# Patient Record
Sex: MALE
[De-identification: names, ages, dates, MRNs, and addresses within clinical notes are randomized per-mention and may not be internally consistent; named-entity substitution may affect disease eponyms.]

---

## 2019-06-25 PROBLEM — Z00.00 ENCOUNTER FOR PREVENTIVE HEALTH EXAMINATION: Status: ACTIVE | Noted: 2019-06-25

## 2019-07-25 ENCOUNTER — APPOINTMENT (OUTPATIENT)
Dept: CARDIOLOGY | Facility: CLINIC | Age: 69
End: 2019-07-25
Payer: MEDICARE

## 2019-07-25 ENCOUNTER — NON-APPOINTMENT (OUTPATIENT)
Age: 69
End: 2019-07-25

## 2019-07-25 VITALS
HEIGHT: 70 IN | HEART RATE: 85 BPM | RESPIRATION RATE: 16 BRPM | OXYGEN SATURATION: 95 % | SYSTOLIC BLOOD PRESSURE: 152 MMHG | DIASTOLIC BLOOD PRESSURE: 103 MMHG | BODY MASS INDEX: 36.65 KG/M2 | WEIGHT: 256 LBS

## 2019-07-25 DIAGNOSIS — N40.0 BENIGN PROSTATIC HYPERPLASIA WITHOUT LOWER URINARY TRACT SYMPMS: ICD-10-CM

## 2019-07-25 DIAGNOSIS — Z86.39 PERSONAL HISTORY OF OTHER ENDOCRINE, NUTRITIONAL AND METABOLIC DISEASE: ICD-10-CM

## 2019-07-25 PROCEDURE — 93000 ELECTROCARDIOGRAM COMPLETE: CPT

## 2019-07-25 PROCEDURE — 99204 OFFICE O/P NEW MOD 45 MIN: CPT

## 2019-07-25 RX ORDER — KRILL/OM-3/DHA/EPA/PHOSPHO/AST 350MG-90MG
CAPSULE ORAL
Refills: 0 | Status: ACTIVE | COMMUNITY

## 2019-07-25 RX ORDER — TAMSULOSIN HYDROCHLORIDE 0.4 MG/1
0.4 CAPSULE ORAL
Refills: 0 | Status: ACTIVE | COMMUNITY

## 2019-07-25 NOTE — HISTORY OF PRESENT ILLNESS
[FreeTextEntry1] : Patient is a 68yo M with HLD,  HTN here for cardiac evaluation. Retired , lives with his wife. HAs 2 sons and 3 grandkids, not in touch with all of them however. Not very active, stopped treadmill when right knee bothered him. Stopped 6 months ago. Feels room spinning recently if gets up quickly or turns head certain positions. Feels winded up stairs. Patient denies PND/orthopnea/edema/palpitations/syncope/claudication. HAd tried 2 statins in past but didn’t tolerate it due to muscle pains/cramps. \par \par ROS: GI negative, all others negative

## 2019-07-25 NOTE — DISCUSSION/SUMMARY
[FreeTextEntry1] : Patient is a 68yo M with obesity, HTN, HLD, BPH here for cardiac evaluation. Gets some mild ALFORD and ECG with ? inferoposterior infarct although could be lead placement. Needs BP treated and HLD, ASCVD is 20%. Will start with BP med and then statin once tolerating well. Has not tolerated statin in past. Vertigo likely BPPV, will evaluate carotids as well. Regarding upcoming prostate biopsy he is CV stable to have procedure done.\par \par 1. Echo and nuclear stress (unable to go on treadmill due to knee pain, will be pharmacologic) test to evaluate ECG and ALFORD\par 2. Carotid to evaluate vertigo\par 3. Valsartan 80mg daily, check BMP/Mg 2 weeks\par 4. Recheck lipids, will start statin at follow up\par 5. CV stable for prostate biopsy\par 6. Follow up after testing in coming weeks

## 2019-07-25 NOTE — PHYSICAL EXAM
[General Appearance - Well Developed] : well developed [General Appearance - Well Nourished] : well nourished [Normal Conjunctiva] : the conjunctiva exhibited no abnormalities [Normal Oropharynx] : normal oropharynx [Normal Jugular Venous V Waves Present] : normal jugular venous V waves present [Heart Rate And Rhythm] : heart rate and rhythm were normal [Heart Sounds] : normal S1 and S2 [] : no respiratory distress [Respiration, Rhythm And Depth] : normal respiratory rhythm and effort [Auscultation Breath Sounds / Voice Sounds] : lungs were clear to auscultation bilaterally [Bowel Sounds] : normal bowel sounds [Abdomen Soft] : soft [Abdomen Tenderness] : non-tender [Abnormal Walk] : normal gait [Nail Clubbing] : no clubbing of the fingernails [Cyanosis, Localized] : no localized cyanosis [Skin Color & Pigmentation] : normal skin color and pigmentation [Skin Turgor] : normal skin turgor [Oriented To Time, Place, And Person] : oriented to person, place, and time [Affect] : the affect was normal [FreeTextEntry1] : no edema

## 2019-08-16 ENCOUNTER — APPOINTMENT (OUTPATIENT)
Dept: CARDIOLOGY | Facility: CLINIC | Age: 69
End: 2019-08-16
Payer: MEDICARE

## 2019-08-16 PROCEDURE — 93015 CV STRESS TEST SUPVJ I&R: CPT

## 2019-08-16 PROCEDURE — 78452 HT MUSCLE IMAGE SPECT MULT: CPT

## 2019-08-16 PROCEDURE — A9500: CPT

## 2019-08-16 RX ORDER — REGADENOSON 0.08 MG/ML
0.4 INJECTION, SOLUTION INTRAVENOUS
Qty: 4 | Refills: 0 | Status: COMPLETED | OUTPATIENT
Start: 2019-08-16

## 2019-08-16 RX ADMIN — REGADENOSON 4 MG/5ML: 0.08 INJECTION, SOLUTION INTRAVENOUS at 00:00

## 2019-08-19 ENCOUNTER — APPOINTMENT (OUTPATIENT)
Dept: CARDIOLOGY | Facility: CLINIC | Age: 69
End: 2019-08-19
Payer: MEDICARE

## 2019-08-19 PROCEDURE — 93306 TTE W/DOPPLER COMPLETE: CPT

## 2019-08-19 PROCEDURE — ZZZZZ: CPT

## 2019-08-19 RX ORDER — PERFLUTREN 6.52 MG/ML
6.52 INJECTION, SUSPENSION INTRAVENOUS
Qty: 2 | Refills: 0 | Status: COMPLETED | OUTPATIENT
Start: 2019-08-19

## 2019-08-19 RX ADMIN — PERFLUTREN MG/ML: 6.52 INJECTION, SUSPENSION INTRAVENOUS at 00:00

## 2019-08-28 ENCOUNTER — APPOINTMENT (OUTPATIENT)
Dept: CARDIOLOGY | Facility: CLINIC | Age: 69
End: 2019-08-28
Payer: MEDICARE

## 2019-08-28 PROCEDURE — 93880 EXTRACRANIAL BILAT STUDY: CPT

## 2019-09-03 ENCOUNTER — NON-APPOINTMENT (OUTPATIENT)
Age: 69
End: 2019-09-03

## 2019-09-03 ENCOUNTER — APPOINTMENT (OUTPATIENT)
Dept: CARDIOLOGY | Facility: CLINIC | Age: 69
End: 2019-09-03
Payer: MEDICARE

## 2019-09-03 VITALS
HEIGHT: 70 IN | HEART RATE: 99 BPM | RESPIRATION RATE: 16 BRPM | DIASTOLIC BLOOD PRESSURE: 80 MMHG | SYSTOLIC BLOOD PRESSURE: 123 MMHG | WEIGHT: 257 LBS | BODY MASS INDEX: 36.79 KG/M2

## 2019-09-03 DIAGNOSIS — R42 DIZZINESS AND GIDDINESS: ICD-10-CM

## 2019-09-03 PROCEDURE — 93000 ELECTROCARDIOGRAM COMPLETE: CPT

## 2019-09-03 PROCEDURE — 99214 OFFICE O/P EST MOD 30 MIN: CPT

## 2019-09-03 NOTE — ASSESSMENT
[FreeTextEntry1] : ECG: SR, consider prior inferoposterior MI \par \par ECHO 8/2019:\par 1. Mildly dilated LV with preserved systolic function and no RWAM, EF 60-65%\par 2. Normal RV/LA/RA\par 3. No clinically significant valvular abnormalities\par 4. TDS/TLS due to body habitus\par \par CAROTID 8/2019:\par 1. No evidence atherosclerosis\par 2. Tortuous vessels bilaterally \par \par  HDL 68   (1/2019)

## 2019-09-03 NOTE — DISCUSSION/SUMMARY
[FreeTextEntry1] : Patient is a 70yo M with obesity, HTN, HLD, BPH here for cardiac follow up. Echo shows normal biventricular function and no clinically significant valvular abnormalities, carotid unremarkable as well. ASCVD is 20%.  Has not tolerated statin in past, ? which agent. Vertigo likely BPPV. Reviewed nuclear stress test images and defects all appear artifactual from diaphragmatic attenuation, bowel tracer uptake and body habitus, regional wall motion is normal. BP better. NO RWMA on echo either to suggest infarct. \par \par 1. ASA 81mg daily and start pravastatin 20mg qhs (with 100mg CoQ 10 30 minutes prior to prevent side effects) given increased CV risk (ASCVD) \par 2. Recommend aggressive diet and lifestyle modifications, counselled on weight loss\par 3. Recommend 30 minutes moderate intensity aerobic activity 5 days per week \par 4. Acceptable low CV risk for surgery for prostate \par 5. REgular PMD follow up \par 6. Check BMP/MG  now on ARB\par 7. Repeat lipids in 6 months

## 2019-09-03 NOTE — PHYSICAL EXAM
[General Appearance - Well Developed] : well developed [General Appearance - Well Nourished] : well nourished [Normal Conjunctiva] : the conjunctiva exhibited no abnormalities [Normal Jugular Venous V Waves Present] : normal jugular venous V waves present [Normal Oropharynx] : normal oropharynx [] : no respiratory distress [Respiration, Rhythm And Depth] : normal respiratory rhythm and effort [Auscultation Breath Sounds / Voice Sounds] : lungs were clear to auscultation bilaterally [Heart Rate And Rhythm] : heart rate and rhythm were normal [Heart Sounds] : normal S1 and S2 [Bowel Sounds] : normal bowel sounds [Abdomen Soft] : soft [Abdomen Tenderness] : non-tender [Abnormal Walk] : normal gait [Nail Clubbing] : no clubbing of the fingernails [Cyanosis, Localized] : no localized cyanosis [Skin Color & Pigmentation] : normal skin color and pigmentation [Skin Turgor] : normal skin turgor [Oriented To Time, Place, And Person] : oriented to person, place, and time [Affect] : the affect was normal [FreeTextEntry1] : no edema

## 2019-09-03 NOTE — HISTORY OF PRESENT ILLNESS
[FreeTextEntry1] : Patient is a 68yo M with HLD,  HTN here for cardiac follow up. Retired , lives with his wife. HAs 2 sons and 3 grandkids, not in touch with all of them however. Not very active, stopped treadmill when right knee bothered him. Stopped 6 months ago. Feels room spinning recently if gets up quickly or turns head certain positions. Feels winded up stairs. Patient denies PND/orthopnea/edema/palpitations/syncope/claudication. HAd tried 2 statins (lovastatin, and ? other)  in past but didn’t tolerate it due to muscle pains/cramps. NO new symptoms since last visit. Patient underwent cardiac testing after last visit. \par \par ROS: GI and  negative

## 2019-09-05 RX ORDER — KIT FOR THE PREPARATION OF TECHNETIUM TC99M SESTAMIBI 1 MG/5ML
INJECTION, POWDER, LYOPHILIZED, FOR SOLUTION PARENTERAL
Refills: 0 | Status: COMPLETED | OUTPATIENT
Start: 2019-09-05

## 2019-09-05 RX ADMIN — KIT FOR THE PREPARATION OF TECHNETIUM TC99M SESTAMIBI 0: 1 INJECTION, POWDER, LYOPHILIZED, FOR SOLUTION PARENTERAL at 00:00

## 2020-01-10 ENCOUNTER — RX RENEWAL (OUTPATIENT)
Age: 70
End: 2020-01-10

## 2020-01-10 ENCOUNTER — MEDICATION RENEWAL (OUTPATIENT)
Age: 70
End: 2020-01-10

## 2020-03-04 RX ORDER — VALSARTAN 80 MG/1
80 TABLET, COATED ORAL DAILY
Qty: 90 | Refills: 1 | Status: DISCONTINUED | COMMUNITY
Start: 2019-07-25 | End: 2020-03-04

## 2020-03-10 ENCOUNTER — APPOINTMENT (OUTPATIENT)
Dept: CARDIOLOGY | Facility: CLINIC | Age: 70
End: 2020-03-10
Payer: MEDICARE

## 2020-03-10 ENCOUNTER — NON-APPOINTMENT (OUTPATIENT)
Age: 70
End: 2020-03-10

## 2020-03-10 VITALS
WEIGHT: 258 LBS | HEIGHT: 70 IN | SYSTOLIC BLOOD PRESSURE: 132 MMHG | DIASTOLIC BLOOD PRESSURE: 90 MMHG | HEART RATE: 78 BPM | RESPIRATION RATE: 16 BRPM | BODY MASS INDEX: 36.94 KG/M2

## 2020-03-10 PROCEDURE — 99214 OFFICE O/P EST MOD 30 MIN: CPT

## 2020-03-10 PROCEDURE — 93000 ELECTROCARDIOGRAM COMPLETE: CPT

## 2020-03-10 RX ORDER — LISINOPRIL 20 MG/1
20 TABLET ORAL DAILY
Qty: 90 | Refills: 1 | Status: DISCONTINUED | COMMUNITY
Start: 2020-03-04 | End: 2020-03-10

## 2020-03-10 NOTE — HISTORY OF PRESENT ILLNESS
[FreeTextEntry1] : Patient is a 70yo M with HLD,  HTN here for cardiac follow up. . Feels winded up stairs. Patient denies PND/orthopnea/edema/palpitations/syncope/claudication. No CP/SOB.  Started on ASA/statin last visit due to high CV risk (> 20%) and tolerating. Since starting lisinopril has had tickle in back of throat and dry cough. Still gaining weight, diet poor. Tolerated statin with CoQ 10 but stopped prior to surgery and then never restarted. \par \par Retired , lives with his wife. HAs 2 sons and 3 grandkids, not in touch with all of them however\par \par ROS: GI and  negative

## 2020-03-10 NOTE — DISCUSSION/SUMMARY
[FreeTextEntry1] : Patient is a 70yo M with obesity, HTN, HLD, BPH here for cardiac follow up. TEsting this past year unremarkable. Echo shows normal biventricular function and no clinically significant valvular abnormalities, carotid unremarkable as well. ASCVD is 20%.. Reviewed nuclear stress test images and defects all appear artifactual. Will change back to ARB due to side effects of ACEI and get back on statin. NEeds to exercise and lose weight. \par \par 1. REstart pravastatin 20mg qhs with COQ 10, was tolerating well\par 2. Recommend aggressive diet and lifestyle modifications, counselled on weight loss\par 3. Recommend 30 minutes moderate intensity aerobic activity 5 days per week \par 4. DC lisinopril, was started on this due to back order on valsartan but having side effects. Will restart valsartan 80mg daily (2 40mg tabs daily)\par 5. REgular PMD follow up \par 6. Check BW with PMD (lipids/BMP) over the summer\par 7. Follow up 6 months

## 2020-03-10 NOTE — PHYSICAL EXAM
[General Appearance - Well Developed] : well developed [General Appearance - Well Nourished] : well nourished [Normal Conjunctiva] : the conjunctiva exhibited no abnormalities [Normal Oropharynx] : normal oropharynx [Normal Jugular Venous V Waves Present] : normal jugular venous V waves present [] : no respiratory distress [Respiration, Rhythm And Depth] : normal respiratory rhythm and effort [Auscultation Breath Sounds / Voice Sounds] : lungs were clear to auscultation bilaterally [Heart Rate And Rhythm] : heart rate and rhythm were normal [Heart Sounds] : normal S1 and S2 [Bowel Sounds] : normal bowel sounds [Abdomen Soft] : soft [Abdomen Tenderness] : non-tender [Abnormal Walk] : normal gait [Nail Clubbing] : no clubbing of the fingernails [Cyanosis, Localized] : no localized cyanosis [Skin Color & Pigmentation] : normal skin color and pigmentation [Skin Turgor] : normal skin turgor [Oriented To Time, Place, And Person] : oriented to person, place, and time [Affect] : the affect was normal [FreeTextEntry1] : no edema

## 2020-03-10 NOTE — ASSESSMENT
[FreeTextEntry1] : ECG: SR, low voltage, early transition\par \par ECHO 8/2019:\par 1. Mildly dilated LV with preserved systolic function and no RWMA, EF 60-65%\par 2. Normal RV/LA/RA\par 3. No clinically significant valvular abnormalities\par 4. TDS/TLS due to body habitus\par \par CAROTID 8/2019:\par 1. No evidence atherosclerosis\par 2. Tortuous vessels bilaterally \par \par  HDL 68   (1/2019)

## 2020-09-08 ENCOUNTER — APPOINTMENT (OUTPATIENT)
Dept: CARDIOLOGY | Facility: CLINIC | Age: 70
End: 2020-09-08
Payer: MEDICARE

## 2020-09-08 ENCOUNTER — NON-APPOINTMENT (OUTPATIENT)
Age: 70
End: 2020-09-08

## 2020-09-08 VITALS
BODY MASS INDEX: 36.94 KG/M2 | HEART RATE: 91 BPM | TEMPERATURE: 97.5 F | SYSTOLIC BLOOD PRESSURE: 140 MMHG | RESPIRATION RATE: 16 BRPM | HEIGHT: 70 IN | DIASTOLIC BLOOD PRESSURE: 89 MMHG | WEIGHT: 258 LBS

## 2020-09-08 VITALS — SYSTOLIC BLOOD PRESSURE: 118 MMHG | DIASTOLIC BLOOD PRESSURE: 78 MMHG

## 2020-09-08 DIAGNOSIS — R06.02 SHORTNESS OF BREATH: ICD-10-CM

## 2020-09-08 DIAGNOSIS — R94.31 ABNORMAL ELECTROCARDIOGRAM [ECG] [EKG]: ICD-10-CM

## 2020-09-08 PROCEDURE — 93000 ELECTROCARDIOGRAM COMPLETE: CPT

## 2020-09-08 PROCEDURE — 99214 OFFICE O/P EST MOD 30 MIN: CPT

## 2020-09-08 NOTE — ASSESSMENT
[FreeTextEntry1] : ECG: SR, low voltage, early transition (unchanged from prior) \par \par PHARM NUCLEAR STRESS TEST 8/2019:\par 1. Reviewed images and all appears artifactual, no clear ischemia\par 2. EF 69%\par \par ECHO 8/2019:\par 1. Mildly dilated LV with preserved systolic function and no RWMA, EF 60-65%\par 2. Normal RV/LA/RA\par 3. No clinically significant valvular abnormalities\par 4. TDS/TLS due to body habitus\par \par CAROTID 8/2019:\par 1. No evidence atherosclerosis\par 2. Tortuous vessels bilaterally \par \par  HDL 68   (1/2019)

## 2020-09-08 NOTE — DISCUSSION/SUMMARY
[FreeTextEntry1] : Patient is a 71yo M with obesity, HTN, HLD, BPH here for cardiac follow up. TEsting summer 2019 mostly unremarkable. Echo shows normal biventricular function and no clinically significant valvular abnormalities, carotid unremarkable as well. Stress test difficult study with significant artifact but no clear ischemia. ASCVD is 20%. Continue statin, BW with PMD pending. \par \par 1. Continue current antihypertensives, blood pressure is well controlled \par 2. Recommend aggressive diet and lifestyle modifications, counselled on weight loss\par 3. Recommend 30 minutes moderate intensity aerobic activity 5 days per week \par 4. Repeat BW with PMD, continue statin and adjust if needed pending results\par 5. Regular PMD follow up \par 6. FOllow up 1 year

## 2020-09-08 NOTE — PHYSICAL EXAM
[General Appearance - Well Developed] : well developed [General Appearance - Well Nourished] : well nourished [Normal Conjunctiva] : the conjunctiva exhibited no abnormalities [Normal Oropharynx] : normal oropharynx [Normal Jugular Venous V Waves Present] : normal jugular venous V waves present [] : no respiratory distress [Respiration, Rhythm And Depth] : normal respiratory rhythm and effort [Auscultation Breath Sounds / Voice Sounds] : lungs were clear to auscultation bilaterally [Heart Rate And Rhythm] : heart rate and rhythm were normal [Heart Sounds] : normal S1 and S2 [Bowel Sounds] : normal bowel sounds [Abdomen Soft] : soft [Abdomen Tenderness] : non-tender [Abnormal Walk] : normal gait [Cyanosis, Localized] : no localized cyanosis [Nail Clubbing] : no clubbing of the fingernails [Skin Color & Pigmentation] : normal skin color and pigmentation [Skin Turgor] : normal skin turgor [Affect] : the affect was normal [Oriented To Time, Place, And Person] : oriented to person, place, and time [FreeTextEntry1] : no edema

## 2020-09-08 NOTE — HISTORY OF PRESENT ILLNESS
[FreeTextEntry1] : Patient is a 69yo M with HLD,  HTN here for cardiac follow up.  Patient denies PND/orthopnea/edema/palpitations/syncope/claudication. No CP, chronic ALFORD up stairs unchanged.  Changed lisinopril to valsartan at last visit due to cough. NO regular activity. \par \par Retired , lives with his wife. HAs 2 sons and 3 grandkids, not in touch with all of them however\par \par ROS: GI and  negative

## 2020-10-26 ENCOUNTER — NON-APPOINTMENT (OUTPATIENT)
Age: 70
End: 2020-10-26

## 2020-12-21 ENCOUNTER — RX RENEWAL (OUTPATIENT)
Age: 70
End: 2020-12-21

## 2020-12-21 RX ORDER — VALSARTAN 40 MG/1
40 TABLET, COATED ORAL
Qty: 180 | Refills: 2 | Status: ACTIVE | COMMUNITY
Start: 2020-03-10 | End: 1900-01-01

## 2021-05-24 ENCOUNTER — RX RENEWAL (OUTPATIENT)
Age: 71
End: 2021-05-24

## 2021-05-24 RX ORDER — PRAVASTATIN SODIUM 40 MG/1
40 TABLET ORAL
Qty: 90 | Refills: 1 | Status: ACTIVE | COMMUNITY
Start: 2019-09-03 | End: 1900-01-01

## 2021-06-28 ENCOUNTER — APPOINTMENT (OUTPATIENT)
Dept: CARDIOLOGY | Facility: CLINIC | Age: 71
End: 2021-06-28
Payer: MEDICARE

## 2021-06-28 ENCOUNTER — NON-APPOINTMENT (OUTPATIENT)
Age: 71
End: 2021-06-28

## 2021-06-28 VITALS
SYSTOLIC BLOOD PRESSURE: 128 MMHG | HEART RATE: 88 BPM | BODY MASS INDEX: 36.79 KG/M2 | RESPIRATION RATE: 16 BRPM | HEIGHT: 70 IN | DIASTOLIC BLOOD PRESSURE: 82 MMHG | WEIGHT: 257 LBS

## 2021-06-28 DIAGNOSIS — E66.9 OBESITY, UNSPECIFIED: ICD-10-CM

## 2021-06-28 DIAGNOSIS — E78.5 HYPERLIPIDEMIA, UNSPECIFIED: ICD-10-CM

## 2021-06-28 DIAGNOSIS — I10 ESSENTIAL (PRIMARY) HYPERTENSION: ICD-10-CM

## 2021-06-28 PROCEDURE — 99214 OFFICE O/P EST MOD 30 MIN: CPT

## 2021-06-28 PROCEDURE — 93000 ELECTROCARDIOGRAM COMPLETE: CPT

## 2021-06-28 NOTE — HISTORY OF PRESENT ILLNESS
[FreeTextEntry1] : Patient is a 71yo M with HLD,  HTN here for cardiac follow up.  Patient denies PND/orthopnea/edema/palpitations/syncope/claudication. No CP, chronic ALFORD up stairs unchanged.  Statin increased last fall. HAd been doing treadmill but had muscle strain and stopped. \par \par Retired , lives with his wife. HAs 2 sons and 3 grandkids, not in touch with all of them however. Moving to [facility] soon. \par \par ROS: GI and  negative

## 2021-06-28 NOTE — ASSESSMENT
[FreeTextEntry1] : ECG: SR, borderline inferior Q waves,  early transition\par \par PHARM NUCLEAR STRESS TEST 8/2019:\par 1. Reviewed images and all appears artifactual, no clear ischemia\par 2. EF 69%\par \par ECHO 8/2019:\par 1. Mildly dilated LV with preserved systolic function and no RWMA, EF 60-65%\par 2. Normal RV/LA/RA\par 3. No clinically significant valvular abnormalities\par 4. TDS/TLS due to body habitus\par \par CAROTID 8/2019:\par 1. No evidence atherosclerosis\par 2. Tortuous vessels bilaterally \par \par  HDL 66   (10/2020) \par  HDL 68   (1/2019)

## 2021-06-28 NOTE — DISCUSSION/SUMMARY
[FreeTextEntry1] : Patient is a 69yo M with obesity, HTN, HLD, BPH here for cardiac follow up. \par -TEsting summer 2019 mostly unremarkable. Echo shows normal biventricular function and no clinically significant valvular abnormalities, carotid unremarkable as well. Stress test difficult study with significant artifact but no clear ischemia. \par -Still with high ASCVD risk\par -TG/TC  high last fall\par \par 1. Continue current antihypertensives, blood pressure is well controlled \par 2. Recommend aggressive diet and lifestyle modifications, counselled on weight loss\par 3. Recommend 30 minutes moderate intensity aerobic activity 5 days per week , states will be walking more and has bike as well. \par 4. PAtient to establish in [place] when moves, will need BW done there and recheck lipids. \par 5. Follow here as needed

## 2021-10-06 PROBLEM — I10 ESSENTIAL HYPERTENSION: Status: ACTIVE | Noted: 2019-07-25

## 2021-11-12 ENCOUNTER — RX RENEWAL (OUTPATIENT)
Age: 71
End: 2021-11-12